# Patient Record
Sex: MALE | Race: WHITE
[De-identification: names, ages, dates, MRNs, and addresses within clinical notes are randomized per-mention and may not be internally consistent; named-entity substitution may affect disease eponyms.]

---
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
## 2018-02-09 ENCOUNTER — HOSPITAL ENCOUNTER (OUTPATIENT)
Dept: HOSPITAL 45 - C.MRI | Age: 18
Discharge: HOME | End: 2018-02-09
Attending: INTERNAL MEDICINE
Payer: COMMERCIAL

## 2018-02-09 DIAGNOSIS — K50.00: Primary | ICD-10-CM

## 2018-02-09 NOTE — DIAGNOSTIC IMAGING REPORT
ENTEROGRAPHY ABD/PELVIS COMBO



CLINICAL HISTORY: 17 years-old Male presenting with K50.00 Crohn's disease of

ileum [id number]. 



TECHNIQUE: Multisequence, multiplanar MR imaging of the abdomen and pelvis was

performed after the administration of oral and before and after intravenous

contrast.  IV contrast: 8.5 mL of Gadavist. 



COMPARISON: 4/4/2017.



FINDINGS:



Localizer images: Unremarkable.



Lung bases: Lung bases clear. Normal heart size. No pericardial or pleural

effusion. 



Liver: Normal morphology. No liver lesion. Patent hepatic vasculature.



Biliary: No intrahepatic or extrahepatic biliary ductal dilatation. Normal

gallbladder.



Pancreas: Normal.



Spleen: Normal.



Adrenal glands: Normal.



Kidneys and ureters: Normal. No hydronephrosis.



Bladder: Normal.



Pelvic organs: Normal.



Bowel: Under distention of small bowel somewhat limits evaluation. Allowing for

this, essential resolution of previously noted small bowel wall thickening in

the distal ileum. A somewhat kinked/whorled configuration of right lower

quadrant small bowel could suggest adhesions in this region. No bowel

obstruction. No convincing evidence of persistent segmental wall thickening of

small bowel. No perienteric inflammatory change.



Peritoneal cavity: No free fluid or intraperitoneal gas.



Lymph nodes: No enlarged lymph nodes in the abdomen or pelvis.



Vasculature: Aorta and IVC patent and normal in caliber.



Abdominal wall: Normal.



Musculoskeletal: Normal.



IMPRESSION:

1.  Resolution of previously noted small bowel wall thickening in the distal

ileum. A somewhat kinked configuration of small bowel in the right lower

quadrant could suggest resultant adhesions. No bowel obstruction. No current

evidence of significant active inflammation.







1.  

2.  

3.  Electronically signed by:  Dylon De Leon M.D.

4.  2/9/2018 4:03 PM

5.  

6.  Dictated Date/Time:  2/9/2018 3:30 PM